# Patient Record
Sex: FEMALE | Race: BLACK OR AFRICAN AMERICAN | Employment: FULL TIME | URBAN - METROPOLITAN AREA
[De-identification: names, ages, dates, MRNs, and addresses within clinical notes are randomized per-mention and may not be internally consistent; named-entity substitution may affect disease eponyms.]

---

## 2017-02-15 ENCOUNTER — APPOINTMENT (OUTPATIENT)
Dept: GENERAL RADIOLOGY | Age: 47
End: 2017-02-15
Attending: EMERGENCY MEDICINE
Payer: COMMERCIAL

## 2017-02-15 ENCOUNTER — HOSPITAL ENCOUNTER (EMERGENCY)
Age: 47
Discharge: HOME | End: 2017-02-15
Attending: EMERGENCY MEDICINE
Payer: COMMERCIAL

## 2017-02-15 VITALS
TEMPERATURE: 98.2 F | RESPIRATION RATE: 16 BRPM | DIASTOLIC BLOOD PRESSURE: 72 MMHG | HEART RATE: 68 BPM | OXYGEN SATURATION: 99 % | SYSTOLIC BLOOD PRESSURE: 121 MMHG

## 2017-02-15 DIAGNOSIS — R07.89 ATYPICAL CHEST PAIN: Primary | ICD-10-CM

## 2017-02-15 LAB
ANION GAP BLD CALC-SCNC: 8 MMOL/L (ref 3–18)
BASOPHILS # BLD AUTO: 0 K/UL (ref 0–0.06)
BASOPHILS # BLD: 0 % (ref 0–2)
BUN SERPL-MCNC: 11 MG/DL (ref 7–18)
BUN/CREAT SERPL: 14 (ref 12–20)
CALCIUM SERPL-MCNC: 9 MG/DL (ref 8.5–10.1)
CHLORIDE SERPL-SCNC: 105 MMOL/L (ref 100–108)
CO2 SERPL-SCNC: 27 MMOL/L (ref 21–32)
CREAT SERPL-MCNC: 0.8 MG/DL (ref 0.6–1.3)
D DIMER PPP FEU-MCNC: 0.45 UG/ML(FEU)
DIFFERENTIAL METHOD BLD: ABNORMAL
EOSINOPHIL # BLD: 0.1 K/UL (ref 0–0.4)
EOSINOPHIL NFR BLD: 2 % (ref 0–5)
ERYTHROCYTE [DISTWIDTH] IN BLOOD BY AUTOMATED COUNT: 11.7 % (ref 11.6–14.5)
GLUCOSE SERPL-MCNC: 109 MG/DL (ref 74–99)
HCT VFR BLD AUTO: 34.2 % (ref 35–45)
HGB BLD-MCNC: 11.7 G/DL (ref 12–16)
LYMPHOCYTES # BLD AUTO: 45 % (ref 21–52)
LYMPHOCYTES # BLD: 2.6 K/UL (ref 0.9–3.6)
MCH RBC QN AUTO: 30.3 PG (ref 24–34)
MCHC RBC AUTO-ENTMCNC: 34.2 G/DL (ref 31–37)
MCV RBC AUTO: 88.6 FL (ref 74–97)
MONOCYTES # BLD: 0.4 K/UL (ref 0.05–1.2)
MONOCYTES NFR BLD AUTO: 6 % (ref 3–10)
NEUTS SEG # BLD: 2.7 K/UL (ref 1.8–8)
NEUTS SEG NFR BLD AUTO: 47 % (ref 40–73)
PLATELET # BLD AUTO: 219 K/UL (ref 135–420)
PMV BLD AUTO: 12.4 FL (ref 9.2–11.8)
POTASSIUM SERPL-SCNC: 3.7 MMOL/L (ref 3.5–5.5)
RBC # BLD AUTO: 3.86 M/UL (ref 4.2–5.3)
SODIUM SERPL-SCNC: 140 MMOL/L (ref 136–145)
WBC # BLD AUTO: 5.7 K/UL (ref 4.6–13.2)

## 2017-02-15 PROCEDURE — 85025 COMPLETE CBC W/AUTO DIFF WBC: CPT | Performed by: EMERGENCY MEDICINE

## 2017-02-15 PROCEDURE — 99283 EMERGENCY DEPT VISIT LOW MDM: CPT

## 2017-02-15 PROCEDURE — 80048 BASIC METABOLIC PNL TOTAL CA: CPT | Performed by: EMERGENCY MEDICINE

## 2017-02-15 PROCEDURE — 99284 EMERGENCY DEPT VISIT MOD MDM: CPT

## 2017-02-15 PROCEDURE — 93005 ELECTROCARDIOGRAM TRACING: CPT

## 2017-02-15 PROCEDURE — 71020 XR CHEST AP LAT: CPT

## 2017-02-15 PROCEDURE — 85379 FIBRIN DEGRADATION QUANT: CPT | Performed by: EMERGENCY MEDICINE

## 2017-02-15 PROCEDURE — 74011250637 HC RX REV CODE- 250/637: Performed by: EMERGENCY MEDICINE

## 2017-02-15 RX ORDER — LIDOCAINE HYDROCHLORIDE 20 MG/ML
15 SOLUTION OROPHARYNGEAL AS NEEDED
Status: DISCONTINUED | OUTPATIENT
Start: 2017-02-15 | End: 2017-02-16 | Stop reason: HOSPADM

## 2017-02-15 RX ADMIN — PHENOBARBITAL 30 ML: 16.2; .1037; .0065; .0194 ELIXIR ORAL at 19:52

## 2017-02-16 LAB
ATRIAL RATE: 75 BPM
CALCULATED P AXIS, ECG09: 56 DEGREES
CALCULATED R AXIS, ECG10: 70 DEGREES
CALCULATED T AXIS, ECG11: 48 DEGREES
DIAGNOSIS, 93000: NORMAL
P-R INTERVAL, ECG05: 148 MS
Q-T INTERVAL, ECG07: 388 MS
QRS DURATION, ECG06: 84 MS
QTC CALCULATION (BEZET), ECG08: 433 MS
VENTRICULAR RATE, ECG03: 75 BPM

## 2017-02-16 NOTE — DISCHARGE INSTRUCTIONS
Chest Pain: Care Instructions  Your Care Instructions  There are many things that can cause chest pain. Some are not serious and will get better on their own in a few days. But some kinds of chest pain need more testing and treatment. Your doctor may have recommended a follow-up visit in the next 8 to 12 hours. If you are not getting better, you may need more tests or treatment. Even though your doctor has released you, you still need to watch for any problems. The doctor carefully checked you, but sometimes problems can develop later. If you have new symptoms or if your symptoms do not get better, get medical care right away. If you have worse or different chest pain or pressure that lasts more than 5 minutes or you passed out (lost consciousness), call 911 or seek other emergency help right away. A medical visit is only one step in your treatment. Even if you feel better, you still need to do what your doctor recommends, such as going to all suggested follow-up appointments and taking medicines exactly as directed. This will help you recover and help prevent future problems. How can you care for yourself at home? · Rest until you feel better. · Take your medicine exactly as prescribed. Call your doctor if you think you are having a problem with your medicine. · Do not drive after taking a prescription pain medicine. When should you call for help? Call 911 if:  · You passed out (lost consciousness). · You have severe difficulty breathing. · You have symptoms of a heart attack. These may include:  ¨ Chest pain or pressure, or a strange feeling in your chest.  ¨ Sweating. ¨ Shortness of breath. ¨ Nausea or vomiting. ¨ Pain, pressure, or a strange feeling in your back, neck, jaw, or upper belly or in one or both shoulders or arms. ¨ Lightheadedness or sudden weakness. ¨ A fast or irregular heartbeat.   After you call 911, the  may tell you to chew 1 adult-strength or 2 to 4 low-dose aspirin. Wait for an ambulance. Do not try to drive yourself. Call your doctor today if:  · You have any trouble breathing. · Your chest pain gets worse. · You are dizzy or lightheaded, or you feel like you may faint. · You are not getting better as expected. · You are having new or different chest pain. Where can you learn more? Go to http://jose-shweta.info/. Enter A120 in the search box to learn more about \"Chest Pain: Care Instructions. \"  Current as of: May 27, 2016  Content Version: 11.1  © 2375-6059 [email or web address]. Care instructions adapted under license by [employer] (which disclaims liability or warranty for this information). If you have questions about a medical condition or this instruction, always ask your healthcare professional. [address] any warranty or liability for your use of this information. Musculoskeletal Chest Pain: Care Instructions  Your Care Instructions  Chest pain is not always a sign that something is wrong with your heart or that you have another serious problem. The doctor thinks your chest pain is caused by strained muscles or ligaments, inflamed chest cartilage, or another problem in your chest, rather than by your heart. You may need more tests to find the cause of your chest pain. Follow-up care is a key part of your treatment and safety. Be sure to make and go to all appointments, and call your doctor if you are having problems. Its also a good idea to know your test results and keep a list of the medicines you take. How can you care for yourself at home? · Take pain medicines exactly as directed. ¨ If the doctor gave you a prescription medicine for pain, take it as prescribed. ¨ If you are not taking a prescription pain medicine, ask your doctor if you can take an over-the-counter medicine. · Rest and protect the sore area.   · Stop, change, or take a break from any activity that may be causing your pain or soreness. · Put ice or a cold pack on the sore area for 10 to 20 minutes at a time. Try to do this every 1 to 2 hours for the next 3 days (when you are awake) or until the swelling goes down. Put a thin cloth between the ice and your skin. · After 2 or 3 days, apply a heating pad set on low or a warm cloth to the area that hurts. Some doctors suggest that you go back and forth between hot and cold. · Do not wrap or tape your ribs for support. This may cause you to take smaller breaths, which could increase your risk of lung problems. · Mentholated creams such as Bengay or Icy Hot may soothe sore muscles. Follow the instructions on the package. · Follow your doctor's instructions for exercising. · Gentle stretching and massage may help you get better faster. Stretch slowly to the point just before pain begins, and hold the stretch for at least 15 to 30 seconds. Do this 3 or 4 times a day. Stretch just after you have applied heat. · As your pain gets better, slowly return to your normal activities. Any increased pain may be a sign that you need to rest a while longer. When should you call for help? Call 911 anytime you think you may need emergency care. For example, call if:  · You have chest pain or pressure. This may occur with:  ¨ Sweating. ¨ Shortness of breath. ¨ Nausea or vomiting. ¨ Pain that spreads from the chest to the neck, jaw, or one or both shoulders or arms. ¨ Dizziness or lightheadedness. ¨ A fast or uneven pulse. After calling 911, chew 1 adult-strength aspirin. Wait for an ambulance. Do not try to drive yourself. · You have sudden chest pain and shortness of breath, or you cough up blood. Call your doctor now or seek immediate medical care if:  · You have any trouble breathing. · Your chest pain gets worse.   · Your chest pain occurs consistently with exercise and is relieved by rest.  Watch closely for changes in your health, and be sure to contact your doctor if:  · Your chest pain does not get better after 1 week. Where can you learn more? Go to http://jose-shweta.info/. Enter V293 in the search box to learn more about \"Musculoskeletal Chest Pain: Care Instructions. \"  Current as of: May 27, 2016  Content Version: 11.1  © 4671-2004 [employer]. Care instructions adapted under license by [employer] (which disclaims liability or warranty for this information). If you have questions about a medical condition or this instruction, always ask your healthcare professional. [address] any warranty or liability for your use of this information.

## 2017-02-16 NOTE — ED PROVIDER NOTES
HPI Comments: 7:11 PM Hunter Salter is a 40 y.o. female who presents to ED complaining of centralized CP with SOB onset at 4 PM today. Pt describes the CP as \"a heaviness and burning sensation. \" Pt states the pain started while at her daughter's doctors appointment. Pt admits she still has CP, but admits the SOB has subsided. She denies the CP traveling anywhere. No other concerns nor complaints at this time. PCP: PROVIDER UNKNOWN      The history is provided by the patient. History reviewed. No pertinent past medical history. History reviewed. No pertinent past surgical history. History reviewed. No pertinent family history. Social History     Social History    Marital status:      Spouse name: N/A    Number of children: N/A    Years of education: N/A     Occupational History    Not on file. Social History Main Topics    Smoking status: Never Smoker    Smokeless tobacco: Not on file    Alcohol use Not on file    Drug use: Not on file    Sexual activity: Not on file     Other Topics Concern    Not on file     Social History Narrative    No narrative on file         ALLERGIES: Review of patient's allergies indicates no known allergies. Review of Systems   Constitutional: Negative for chills, fatigue, fever and unexpected weight change. HENT: Negative for congestion and rhinorrhea. Respiratory: Positive for shortness of breath. Negative for chest tightness. Cardiovascular: Positive for chest pain (centralized). Negative for palpitations and leg swelling. Gastrointestinal: Negative for abdominal pain, nausea and vomiting. Genitourinary: Negative for dysuria. Musculoskeletal: Negative for back pain. Skin: Negative for rash. Neurological: Negative for dizziness and weakness. Psychiatric/Behavioral: The patient is not nervous/anxious. All other systems reviewed and are negative.       Vitals:    02/15/17 1735   BP: 128/81   Pulse: 72   Resp: 16 Temp: 98.2 °F (36.8 °C)   SpO2: 97%            Physical Exam   Constitutional: She is oriented to person, place, and time. She appears well-developed and well-nourished. No distress. HENT:   Head: Normocephalic and atraumatic. Right Ear: External ear normal.   Left Ear: External ear normal.   Nose: Nose normal.   Mouth/Throat: Oropharynx is clear and moist.   Eyes: Conjunctivae and EOM are normal. Pupils are equal, round, and reactive to light. No scleral icterus. Neck: Normal range of motion. Neck supple. No JVD present. No tracheal deviation present. No thyromegaly present. Cardiovascular: Normal rate, regular rhythm, normal heart sounds and intact distal pulses. Exam reveals no gallop and no friction rub. No murmur heard. Pulmonary/Chest: Effort normal and breath sounds normal. She exhibits no tenderness. Abdominal: Soft. Bowel sounds are normal. She exhibits no distension. There is no tenderness. There is no rebound and no guarding. Musculoskeletal: Normal range of motion. She exhibits no edema or tenderness. Lymphadenopathy:     She has no cervical adenopathy. Neurological: She is alert and oriented to person, place, and time. No cranial nerve deficit. Coordination normal.   No sensory loss, Gait normal, Motor 5/5   Skin: Skin is warm and dry. Psychiatric: She has a normal mood and affect. Her behavior is normal. Judgment and thought content normal.   Nursing note and vitals reviewed. MDM  Number of Diagnoses or Management Options  Atypical chest pain:   Diagnosis management comments: Pt feels better, results reviewed with her. She agrees with dispo and F/U plan.   Sylvia Gray MD  8:53 PM         Amount and/or Complexity of Data Reviewed  Clinical lab tests: ordered and reviewed  Tests in the radiology section of CPT®: ordered and reviewed      ED Course       Procedures  Vitals:  Patient Vitals for the past 12 hrs:   Temp Pulse Resp BP SpO2   02/15/17 1735 98.2 °F (36.8 °C) 72 16 128/81 97 %         Medications ordered:   Medications   lidocaine (XYLOCAINE) 2 % viscous solution 15 mL (not administered)   maalox/donnatal (GI COCKTAIL COMPOUND) oral liquid (30 mL Oral Given 2/15/17 1952)         Lab findings:  Recent Results (from the past 12 hour(s))   EKG, 12 LEAD, INITIAL    Collection Time: 02/15/17  5:17 PM   Result Value Ref Range    Ventricular Rate 75 BPM    Atrial Rate 75 BPM    P-R Interval 148 ms    QRS Duration 84 ms    Q-T Interval 388 ms    QTC Calculation (Bezet) 433 ms    Calculated P Axis 56 degrees    Calculated R Axis 70 degrees    Calculated T Axis 48 degrees    Diagnosis       Normal sinus rhythm  Normal ECG  No previous ECGs available     METABOLIC PANEL, BASIC    Collection Time: 02/15/17  5:27 PM   Result Value Ref Range    Sodium 140 136 - 145 mmol/L    Potassium 3.7 3.5 - 5.5 mmol/L    Chloride 105 100 - 108 mmol/L    CO2 27 21 - 32 mmol/L    Anion gap 8 3.0 - 18 mmol/L    Glucose 109 (H) 74 - 99 mg/dL    BUN 11 7.0 - 18 MG/DL    Creatinine 0.80 0.6 - 1.3 MG/DL    BUN/Creatinine ratio 14 12 - 20      GFR est AA >60 >60 ml/min/1.73m2    GFR est non-AA >60 >60 ml/min/1.73m2    Calcium 9.0 8.5 - 10.1 MG/DL   CBC WITH AUTOMATED DIFF    Collection Time: 02/15/17  5:27 PM   Result Value Ref Range    WBC 5.7 4.6 - 13.2 K/uL    RBC 3.86 (L) 4.20 - 5.30 M/uL    HGB 11.7 (L) 12.0 - 16.0 g/dL    HCT 34.2 (L) 35.0 - 45.0 %    MCV 88.6 74.0 - 97.0 FL    MCH 30.3 24.0 - 34.0 PG    MCHC 34.2 31.0 - 37.0 g/dL    RDW 11.7 11.6 - 14.5 %    PLATELET 100 560 - 645 K/uL    MPV 12.4 (H) 9.2 - 11.8 FL    NEUTROPHILS 47 40 - 73 %    LYMPHOCYTES 45 21 - 52 %    MONOCYTES 6 3 - 10 %    EOSINOPHILS 2 0 - 5 %    BASOPHILS 0 0 - 2 %    ABS. NEUTROPHILS 2.7 1.8 - 8.0 K/UL    ABS. LYMPHOCYTES 2.6 0.9 - 3.6 K/UL    ABS. MONOCYTES 0.4 0.05 - 1.2 K/UL    ABS. EOSINOPHILS 0.1 0.0 - 0.4 K/UL    ABS.  BASOPHILS 0.0 0.0 - 0.06 K/UL    DF AUTOMATED     D DIMER    Collection Time: 02/15/17  5:27 PM Result Value Ref Range    D DIMER 0.45 <0.46 ug/ml(FEU)     EKG  NSR at a rate of 75 BPM. No acute changes. Read by Dr. Bishop Speak 7:20 PM      X-Ray, CT or other radiology findings or impressions:  XR Chest   No active cardiopulmonary disease  Read by radiology     Progress notes, Consult notes or additional Procedure notes:   8:49 PM  Reassessed pt. Patient is feeling better. Reviewed all results with pt and pt agrees with plan for discharge and appropriate  Follow up. All questions answered at this time. Pt voices understanding. Disposition:  Diagnosis: No diagnosis found. Disposition: discharged     Follow-up Information     Follow up With Details Comments [address] [place] [address] Schedule an appointment as soon as possible for a visit in 1 week If symptoms worsen return to the ER [address]  [address] [address]  [address]  [phone]           Patient's Medications    No medications on file     [address] for and in the presence of Yarelis Burton MD (02/15/17/ 7:11 PM)    Physician Attestation  I personally performed the services described in this documentation, reviewed, and edited the documentation which was dictated to the scribe in my presence, and it accurately records my own words and actions.      Yarelis Burton MD (02/15/17/ 7:11 PM)    Signed by: Sarah Yanez, 02/15/17, 7:11 PM\

## 2019-12-05 ENCOUNTER — HOSPITAL ENCOUNTER (OUTPATIENT)
Dept: MAMMOGRAPHY | Age: 49
Discharge: HOME | End: 2019-12-05
Attending: FAMILY MEDICINE
Payer: COMMERCIAL

## 2019-12-05 DIAGNOSIS — Z12.31 VISIT FOR SCREENING MAMMOGRAM: ICD-10-CM

## 2019-12-05 PROCEDURE — 77063 BREAST TOMOSYNTHESIS BI: CPT

## 2020-11-20 ENCOUNTER — TRANSCRIBE ORDER (OUTPATIENT)
Dept: SCHEDULING | Age: 50
End: 2020-11-20

## 2020-11-20 DIAGNOSIS — Z12.31 SCREENING MAMMOGRAM FOR HIGH-RISK PATIENT: Primary | ICD-10-CM

## 2021-02-05 ENCOUNTER — HOSPITAL ENCOUNTER (OUTPATIENT)
Dept: MAMMOGRAPHY | Age: 51
Discharge: HOME | End: 2021-02-05
Attending: FAMILY MEDICINE
Payer: COMMERCIAL

## 2021-02-05 DIAGNOSIS — Z12.31 SCREENING MAMMOGRAM FOR HIGH-RISK PATIENT: ICD-10-CM

## 2021-02-05 PROCEDURE — 77063 BREAST TOMOSYNTHESIS BI: CPT

## 2021-12-31 ENCOUNTER — TRANSCRIBE ORDER (OUTPATIENT)
Dept: SCHEDULING | Age: 51
End: 2021-12-31

## 2021-12-31 DIAGNOSIS — Z12.31 VISIT FOR SCREENING MAMMOGRAM: Primary | ICD-10-CM

## 2022-02-10 ENCOUNTER — HOSPITAL ENCOUNTER (OUTPATIENT)
Dept: MAMMOGRAPHY | Age: 52
Discharge: HOME | End: 2022-02-10
Attending: FAMILY MEDICINE
Payer: COMMERCIAL

## 2022-02-10 DIAGNOSIS — Z12.31 VISIT FOR SCREENING MAMMOGRAM: ICD-10-CM

## 2022-02-10 PROCEDURE — 77067 SCR MAMMO BI INCL CAD: CPT

## 2022-02-10 PROCEDURE — 77063 BREAST TOMOSYNTHESIS BI: CPT

## 2023-01-20 ENCOUNTER — TRANSCRIBE ORDER (OUTPATIENT)
Dept: SCHEDULING | Age: 53
End: 2023-01-20

## 2023-01-20 DIAGNOSIS — Z12.31 ENCOUNTER FOR SCREENING MAMMOGRAM FOR BREAST CANCER: Primary | ICD-10-CM

## 2023-01-28 ENCOUNTER — TRANSCRIBE ORDERS (OUTPATIENT)
Facility: HOSPITAL | Age: 53
End: 2023-01-28

## 2023-01-28 DIAGNOSIS — Z12.31 VISIT FOR SCREENING MAMMOGRAM: Primary | ICD-10-CM

## 2023-02-01 DIAGNOSIS — Z12.31 ENCOUNTER FOR SCREENING MAMMOGRAM FOR BREAST CANCER: Primary | ICD-10-CM

## 2023-02-05 DIAGNOSIS — Z12.31 ENCOUNTER FOR SCREENING MAMMOGRAM FOR BREAST CANCER: Primary | ICD-10-CM

## 2023-02-14 ENCOUNTER — HOSPITAL ENCOUNTER (OUTPATIENT)
Facility: HOSPITAL | Age: 53
Discharge: HOME | End: 2023-02-17
Payer: COMMERCIAL

## 2023-02-14 DIAGNOSIS — Z12.31 VISIT FOR SCREENING MAMMOGRAM: ICD-10-CM

## 2023-02-14 PROCEDURE — 77063 BREAST TOMOSYNTHESIS BI: CPT

## 2024-02-16 ENCOUNTER — HOSPITAL ENCOUNTER (OUTPATIENT)
Facility: HOSPITAL | Age: 54
Setting detail: RECURRING SERIES
Discharge: HOME | End: 2024-02-19
Payer: COMMERCIAL

## 2024-02-16 PROCEDURE — 97535 SELF CARE MNGMENT TRAINING: CPT

## 2024-02-16 PROCEDURE — 97161 PT EVAL LOW COMPLEX 20 MIN: CPT

## 2024-02-16 PROCEDURE — 97110 THERAPEUTIC EXERCISES: CPT

## 2024-02-16 NOTE — PROGRESS NOTES
[facility]  [address] [facility] [address] [place] [address] Ph:[phone] Fx: [phone]    PLAN OF CARE/ Statement of Necessity for Physical Therapy Services           Patient name: Jenny Aparicio Start of Care: 2024   Referral source: Zuleyma Link MD : 1970    Medical Diagnosis: Back pain [M54.9]  Leg pain [M79.606]       Onset Date:    Treatment Diagnosis: M54.59  OTHER LOWER BACK PAIN                                     Prior Hospitalization: see medical history Provider#: [id number]   Medications: Verified on Patient Summary List     Comorbidities:  Per pt no medical history   Prior Level of Function: functionally independent, no AD.     The Plan of Care and following information is based on the information from the initial evaluation.    Assessment / key information:   The patient is a 53-year-old female referred to therapy with lumbar radiculopathy pain. The patient reported a current pain level of 0/10 which gets worsen to 10/10 with lying on side and prolong sitting . The patient reports sudden onset of low back pain while lying a year ago and has intermittent radiating pain in B LE, right more than left. Pt reports pain eases with exercises and walking. Pts recent x-ray showed bone spurs. Pt denies tingling and numbness this date and described pain as sharp and shooting in nature. During the objective assessment, the patient demonstrated increased pain during repeated flexion. The patient pointed pain in right lower back (QL area) describing sharp in nature. Additionally, the patient showed positive tenderness, stiffness, and weakness in both lower extremities, resulting in functional limitations. Furthermore, pt showed +slump test on right possible disc involvement. Skilled therapy is recommended to address the above deficits and help the patient return to her prior level of function.     Evaluation Complexity:  History:  LOW 
flu)  [] Yes [x] No Numbness/tingling in buttock/genitalia region    Past History/Treatments:     Diagnostic Tests: [] Lab work [x] X-rays    [] CT [] MRI     [] Other:  Results: per pts bone spurs.       OBJECTIVE  Posture:  Lateral Shift:  [] R    [] L     [] +  [] -  Kyphosis:  [] Increased [] Decreased   []  WNL  Lordosis:   [] Increased [x] Decreased   [] WNL  Pelvic symmetry:  [x] WNL    [] Other:    Gait:  [x] Normal     [] Abnormal:    Active Movements: [] N/A   [] Too acute   [] Other:  ROM AROM in cm % PROM Comments:pain, area   Forward flexion 40-60 12     Extension 20-30      SB right 20-30 49  Pain increased   SB left 20-30 49     Rotation right 5-10      Rotation left 5-10        Repeated Movements   Effects on present pain: produces (OH), abolishes (A), increases (incr), decreases (decr), centralizes (C), peripheral (PH), no effect (NE)   Pre-Test Sx Flexion Repeated Flexion Extension Repeated Extension Repeated SBL Repeated SBR Repeated   Rotation Right Repeated   Rotation left   Sitting   incr  decr Incr NE NE Incr   Standing            Lying      N/A N/A     Comments: Patient pointed pain on right lower back.     Dural Mobility:  SLR Sitting:  [x] R    [x] L    [] +    [x] -            Supine:  [] R    [] L    [] +    [] -    Slump Test:  [x] R    [] L    [x] +    [] -    Prone Knee Bend: [] R    [] L    [] +    [] -     Palpation  [] Min  [x] Mod  [] Severe    Location: Tenderness of right QL and lumbar paraspinals.  [] Min  [] Mod  [] Severe    Location:  [] Min  [] Mod  [] Severe    Location:    Strength   L(0-5) R (0-5) N/T   Hip Flexion  4+ 4+ []   Hip Extension 4+ 4 []   Hip Abduction 4+ 4 []   Hip Adduction 4+ 4+ []   Knee Extension   []   Knee Flexion    []   Ankle Dorsiflexion   []   Ankle Plantar flexion   []   Ankle Inversion   []   Ankle Eversion   []   Great Toe Extension   []   Other   []             Pain Level (0-10 scale) post treatment: 4/10    ASSESSMENT/Changes in Function:

## 2024-02-23 ENCOUNTER — HOSPITAL ENCOUNTER (OUTPATIENT)
Facility: HOSPITAL | Age: 54
Setting detail: RECURRING SERIES
Discharge: HOME | End: 2024-02-26
Payer: COMMERCIAL

## 2024-02-23 PROCEDURE — 97112 NEUROMUSCULAR REEDUCATION: CPT

## 2024-02-23 PROCEDURE — 97110 THERAPEUTIC EXERCISES: CPT

## 2024-02-23 NOTE — PROGRESS NOTES
PHYSICAL / OCCUPATIONAL THERAPY - DAILY TREATMENT NOTE     Patient Name: Jenny Aparicio    Date: 2024    : 1970  Insurance: Payor: [employer] / Plan: [facility] / Product Type: *No Product type* /      Patient  verified Yes     Visit #   Current / Total 2 24   Time   In / Out 10:32 11:10   Pain   In / Out 0 0   Subjective Functional Status/Changes: Pt reports feeling good this morning   Changes to:  Allergies, Med Hx, Sx Hx?   no       TREATMENT AREA =  Back pain [M54.9]    OBJECTIVE      Therapeutic Procedures:  Tx Min Billable or 1:1 Min (if diff from Tx Min) Procedure, Rationale, Specifics   28  11999 Therapeutic Exercise (timed):  increase ROM, strength, coordination, balance, and proprioception to improve patient's ability to progress to PLOF and address remaining functional goals. (see flow sheet as applicable)    Details if applicable:       10  12089 Neuromuscular Re-Education (timed):  improve balance, coordination, kinesthetic sense, posture, core stability and proprioception to improve patient's ability to develop conscious control of individual muscles and awareness of position of extremities in order to progress to PLOF and address remaining functional goals. (see flow sheet as applicable)    Details if applicable:     38  [facility] Totals Reminder: bill using total billable min of TIMED therapeutic procedures (example: do not include dry needle or estim unattended, both untimed codes, in totals to left)  8-22 min = 1 unit; 23-37 min = 2 units; 38-52 min = 3 units; 53-67 min = 4 units; 68-82 min = 5 units   Total Total     TOTAL TREATMENT TIME:        38     [x]  Patient Education billed concurrently with other procedures   [x] Review HEP    [] Progressed/Changed HEP, detail:    [] Other detail:       Objective Information/Functional Measures/Assessment    First f/u after Eval. Demo's good tolerance to therex, vc's for technique and posture as needed. Pt reports min central LBP

## 2024-02-26 ENCOUNTER — HOSPITAL ENCOUNTER (OUTPATIENT)
Facility: HOSPITAL | Age: 54
Setting detail: RECURRING SERIES
Discharge: HOME | End: 2024-02-29
Payer: COMMERCIAL

## 2024-02-26 PROCEDURE — 97110 THERAPEUTIC EXERCISES: CPT

## 2024-02-26 PROCEDURE — 97112 NEUROMUSCULAR REEDUCATION: CPT

## 2024-02-26 NOTE — PROGRESS NOTES
PHYSICAL / OCCUPATIONAL THERAPY - DAILY TREATMENT NOTE     Patient Name: Jenny Aparicio    Date: 2024    : 1970  Insurance: Payor: [employer] / Plan: [facility] [facility] / Product Type: *No Product type* /      Patient  verified Yes     Visit #   Current / Total 3 24   Time   In / Out 353 4:38   Pain   In / Out 0 0   Subjective Functional Status/Changes: Pt reports no pain upon arrival.    Changes to:  Allergies, Med Hx, Sx Hx?   no       TREATMENT AREA =  Back pain [M54.9]    OBJECTIVE    Therapeutic Procedures:  Tx Min Billable or 1:1 Min (if diff from Tx Min) Procedure, Rationale, Specifics   35  22816 Therapeutic Exercise (timed):  increase ROM, strength, coordination, balance, and proprioception to improve patient's ability to progress to PLOF and address remaining functional goals. (see flow sheet as applicable)    Details if applicable:       10  13746 Neuromuscular Re-Education (timed):  improve balance, coordination, kinesthetic sense, posture, core stability and proprioception to improve patient's ability to develop conscious control of individual muscles and awareness of position of extremities in order to progress to PLOF and address remaining functional goals. (see flow sheet as applicable)    Details if applicable:            Details if applicable:           Details if applicable:            Details if applicable:     45  [facility] Totals Reminder: bill using total billable min of TIMED therapeutic procedures (example: do not include dry needle or estim unattended, both untimed codes, in totals to left)  8-22 min = 1 unit; 23-37 min = 2 units; 38-52 min = 3 units; 53-67 min = 4 units; 68-82 min = 5 units   Total Total     TOTAL TREATMENT TIME:        45     [x]  Patient Education billed concurrently with other procedures   [x] Review HEP    [] Progressed/Changed HEP, detail:    [] Other detail:       Objective Information/Functional Measures/Assessment  Mobility limitations more on the

## 2024-02-29 ENCOUNTER — HOSPITAL ENCOUNTER (OUTPATIENT)
Facility: HOSPITAL | Age: 54
Setting detail: RECURRING SERIES
End: 2024-02-29
Payer: COMMERCIAL

## 2024-02-29 PROCEDURE — 97110 THERAPEUTIC EXERCISES: CPT

## 2024-02-29 PROCEDURE — 97112 NEUROMUSCULAR REEDUCATION: CPT

## 2024-02-29 NOTE — PROGRESS NOTES
PHYSICAL / OCCUPATIONAL THERAPY - DAILY TREATMENT NOTE     Patient Name: Jenny Aparicio    Date: 2024    : 1970  Insurance: Payor: [employer] / Plan: [facility] [facility] / Product Type: *No Product type* /      Patient  verified Yes     Visit #   Current / Total 4 24   Time   In / Out 4:30 5:08   Pain   In / Out 0 0   Subjective Functional Status/Changes: Pt reports, she has radha doing exercises at home and has no pain.   Changes to:  Allergies, Med Hx, Sx Hx?   no       TREATMENT AREA =  Back pain [M54.9]    OBJECTIVE      Therapeutic Procedures:  Tx Min Billable or 1:1 Min (if diff from Tx Min) Procedure, Rationale, Specifics   24  58872 Neuromuscular Re-Education (timed):  improve balance, coordination, kinesthetic sense, posture, core stability and proprioception to improve patient's ability to develop conscious control of individual muscles and awareness of position of extremities in order to progress to PLOF and address remaining functional goals. (see flow sheet as applicable)    Details if applicable:       14  38736 Therapeutic Exercise (timed):  increase ROM, strength, coordination, balance, and proprioception to improve patient's ability to progress to PLOF and address remaining functional goals. (see flow sheet as applicable)    Details if applicable:            Details if applicable:           Details if applicable:            Details if applicable:     38  [facility] Totals Reminder: bill using total billable min of TIMED therapeutic procedures (example: do not include dry needle or estim unattended, both untimed codes, in totals to left)  8-22 min = 1 unit; 23-37 min = 2 units; 38-52 min = 3 units; 53-67 min = 4 units; 68-82 min = 5 units   Total Total     TOTAL TREATMENT TIME:        38     [x]  Patient Education billed concurrently with other procedures   [x] Review HEP    [] Progressed/Changed HEP, detail:    [] Other detail:       Objective Information/Functional

## 2024-03-04 ENCOUNTER — HOSPITAL ENCOUNTER (OUTPATIENT)
Facility: HOSPITAL | Age: 54
Setting detail: RECURRING SERIES
Discharge: HOME | End: 2024-03-07
Payer: COMMERCIAL

## 2024-03-04 PROCEDURE — 97112 NEUROMUSCULAR REEDUCATION: CPT

## 2024-03-04 PROCEDURE — 97110 THERAPEUTIC EXERCISES: CPT

## 2024-03-04 NOTE — PROGRESS NOTES
Measures/Assessment  Several exercises on TC due to pt late arrival. 1.5# ankle weight added to exercises at the barrel to continue to strengthen the glutes and B hips and aid with increased L/S stability. Continued pain with prolonged sitting and with repeated flexion through the L/S. The pt notes no pain post treatment.       Patient will continue to benefit from skilled PT / OT services to modify and progress therapeutic interventions, analyze and address functional mobility deficits, analyze and address ROM deficits, analyze and address strength deficits, analyze and address soft tissue restrictions, and analyze and cue for proper movement patterns to address functional deficits and attain remaining goals.    Progress toward goals / Updated goals:  [x]  See Progress Note/Recertification  Short Term Goals: To be accomplished in 4 weeks:  Patient will be independent and compliant with HEP to progress toward goals and restore functional mobility.   Eval Status: issued at eval  2/23/2024: Pt reports compliance, MET     Patient will improve pain in low back to 6/10 at worst  to improve household chore tolerance.  Eval Status: 10/10 at worst  2/29/24: 7/10 at worst. Progressing.      Long Term Goals: To be accomplished in 12 weeks:  Patient will improve FOTO score by 68 points to improve functional tolerance for household tasks.  Eval Status: FOTO 56       2.   Pt will report minimal to no pain during repeated bending to aid in functional mechanics for ADLs.  Eval Status: increased pain with repeated flexion  Current: no change 3/4/24 increased pain with repeated flexion     3.   Pt will have 5/5 B LE strength to return to goals of increased postural stability and mechanics during bending and lifting activities.  Eval Status: 4/5 to 4+/5     4.   Patient will improve pain in low back to 4/10 at worst to improve sitting and lying on side tolerance and restore prior level of function.  Eval Status: 10/10 at

## 2024-03-06 ENCOUNTER — HOSPITAL ENCOUNTER (OUTPATIENT)
Facility: HOSPITAL | Age: 54
Setting detail: RECURRING SERIES
Discharge: HOME | End: 2024-03-09
Payer: COMMERCIAL

## 2024-03-06 PROCEDURE — 97112 NEUROMUSCULAR REEDUCATION: CPT

## 2024-03-06 PROCEDURE — 97110 THERAPEUTIC EXERCISES: CPT

## 2024-03-06 NOTE — PROGRESS NOTES
PHYSICAL / OCCUPATIONAL THERAPY - DAILY TREATMENT NOTE     Patient Name: Jenny Aparicio    Date: 3/6/2024    : 1970  Insurance: Payor: GIORGI / Plan: [facility] [employer] / Product Type: *No Product type* /      Patient  verified Yes     Visit #   Current / Total 6 24   Time   In / Out 358 446   Pain   In / Out 0 0   Subjective Functional Status/Changes: Pt reports no new changes.   Changes to:  Allergies, Med Hx, Sx Hx?   no       TREATMENT AREA =  Back pain [M54.9]    OBJECTIVE    Therapeutic Procedures:  Tx Min Billable or 1:1 Min (if diff from Tx Min) Procedure, Rationale, Specifics   33  80953 Therapeutic Exercise (timed):  increase ROM, strength, coordination, balance, and proprioception to improve patient's ability to progress to PLOF and address remaining functional goals. (see flow sheet as applicable)    Details if applicable:       15  44279 Neuromuscular Re-Education (timed):  improve balance, coordination, kinesthetic sense, posture, core stability and proprioception to improve patient's ability to develop conscious control of individual muscles and awareness of position of extremities in order to progress to PLOF and address remaining functional goals. (see flow sheet as applicable)    Details if applicable:  prone series, DD: marching, UE/LE          Details if applicable:           Details if applicable:            Details if applicable:     48  [facility] Totals Reminder: bill using total billable min of TIMED therapeutic procedures (example: do not include dry needle or estim unattended, both untimed codes, in totals to left)  8-22 min = 1 unit; 23-37 min = 2 units; 38-52 min = 3 units; 53-67 min = 4 units; 68-82 min = 5 units   Total Total     TOTAL TREATMENT TIME:        48     [x]  Patient Education billed concurrently with other procedures   [x] Review HEP    [] Progressed/Changed HEP, detail:    [] Other detail:       Objective Information/Functional Measures/Assessment  [place]

## 2024-03-11 ENCOUNTER — HOSPITAL ENCOUNTER (OUTPATIENT)
Facility: HOSPITAL | Age: 54
Setting detail: RECURRING SERIES
Discharge: HOME | End: 2024-03-14
Payer: COMMERCIAL

## 2024-03-11 PROCEDURE — 97110 THERAPEUTIC EXERCISES: CPT

## 2024-03-11 PROCEDURE — 97112 NEUROMUSCULAR REEDUCATION: CPT

## 2024-03-11 NOTE — PROGRESS NOTES
back to 4/10 at worst to improve sitting and lying on side tolerance and restore prior level of function.  Eval Status: 10/10 at worst  2/29/24: 7/10 at worst. Progressing.       PLAN  Yes  Continue plan of care  [x]  Upgrade activities as tolerated  []  Discharge due to :  []  Other:    Jing Kaur PTA    3/11/2024    3:56 PM    Future Appointments   Date Time Provider Department Center   3/13/2024  3:50 PM Jing Kaur PTA [facility]   3/15/2024  3:15 PM HBV [facility] RM 3 3D [facility]

## 2024-03-13 ENCOUNTER — HOSPITAL ENCOUNTER (OUTPATIENT)
Facility: HOSPITAL | Age: 54
Setting detail: RECURRING SERIES
Discharge: HOME | End: 2024-03-16
Payer: COMMERCIAL

## 2024-03-13 PROCEDURE — 97110 THERAPEUTIC EXERCISES: CPT

## 2024-03-13 PROCEDURE — 97530 THERAPEUTIC ACTIVITIES: CPT

## 2024-03-13 NOTE — PROGRESS NOTES
PHYSICAL / OCCUPATIONAL THERAPY - DAILY TREATMENT NOTE     Patient Name: Jenny Aparicio    Date: 3/13/2024    : 1970  Insurance: Payor: GOIRGI / Plan: [facility] / Product Type: *No Product type* /      Patient  verified Yes     Visit #   Current / Total 8 24   Time   In / Out 355 430   Pain   In / Out 0 (low back), 5/10 (upper back) 0 (Low back)  5 (upper back)   Subjective Functional Status/Changes: Pt reports she is still having a lot of pain in the left side of her upper back, \"kind of in the back of my shoulder\" that has increased to a 5/10.    Changes to:  Allergies, Med Hx, Sx Hx?   no       TREATMENT AREA =  Back pain [M54.9]    OBJECTIVE    Therapeutic Procedures:  Tx Min Billable or 1:1 Min (if diff from Tx Min) Procedure, Rationale, Specifics   27  50820 Therapeutic Exercise (timed):  increase ROM, strength, coordination, balance, and proprioception to improve patient's ability to progress to PLOF and address remaining functional goals. (see flow sheet as applicable)    Details if applicable:       8  72314 Therapeutic Activity (timed):  use of dynamic activities replicating functional movements to increase ROM, strength, coordination, balance, and proprioception in order to improve patient's ability to progress to PLOF and address remaining functional goals.  (see flow sheet as applicable)    Details if applicable:  Goal check, FOTO           Details if applicable:           Details if applicable:            Details if applicable:     35  [facility] Totals Reminder: bill using total billable min of TIMED therapeutic procedures (example: do not include dry needle or estim unattended, both untimed codes, in totals to left)  8-22 min = 1 unit; 23-37 min = 2 units; 38-52 min = 3 units; 53-67 min = 4 units; 68-82 min = 5 units   Total Total     TOTAL TREATMENT TIME:        35     [x]  Patient Education billed concurrently with other procedures   [x] Review HEP    [] Progressed/Changed 
to improve sitting and lying on side tolerance and restore prior level of function.  Eval Status: 10/10 at worst  PN: 7/10 at worst. Progressing.       Summary of Care/ Key Functional Changes:   The pt is progressing well with treatment to her low back displaying gradual but increased B hip strength and noting decreasing pain. The pt was able to meet her FOTO goal indicating improved quality of life and notes minor increases in prolonged sitting tolerance. The pt does continue to report increased pain with repetitive ext of the L/S and still continues to have some increased pain with prolonged sitting, although slowly improving. Pt is to benefit from continued treatment to improve Core strength, sitting tolerance and over all functional mobility to aid with ease of ADL's.     ASSESSMENT/RECOMMENDATIONS:    Continue per plan of care.     Thank you for this referral.   Jing Kaur, PTA 3/13/2024 5:29 PM  Madhu Song, PT, CMTPT/DN

## 2024-03-15 ENCOUNTER — HOSPITAL ENCOUNTER (OUTPATIENT)
Facility: HOSPITAL | Age: 54
Discharge: HOME | End: 2024-03-15
Payer: COMMERCIAL

## 2024-03-15 DIAGNOSIS — Z12.31 VISIT FOR SCREENING MAMMOGRAM: ICD-10-CM

## 2024-03-15 PROCEDURE — 77063 BREAST TOMOSYNTHESIS BI: CPT

## 2024-03-19 ENCOUNTER — APPOINTMENT (OUTPATIENT)
Facility: HOSPITAL | Age: 54
End: 2024-03-19
Payer: COMMERCIAL

## 2024-03-22 ENCOUNTER — HOSPITAL ENCOUNTER (OUTPATIENT)
Facility: HOSPITAL | Age: 54
Setting detail: RECURRING SERIES
Discharge: HOME | End: 2024-03-25
Payer: COMMERCIAL

## 2024-03-22 PROCEDURE — 97112 NEUROMUSCULAR REEDUCATION: CPT

## 2024-03-22 PROCEDURE — 97110 THERAPEUTIC EXERCISES: CPT

## 2024-03-22 NOTE — PROGRESS NOTES
PHYSICAL / OCCUPATIONAL THERAPY - DAILY TREATMENT NOTE     Patient Name: Jenny Aparicio    Date: 3/22/2024    : 1970  Insurance: Payor: GIORGI / Plan: [facility] / Product Type: *No Product type* /      Patient  verified Yes     Visit #   Current / Total 9 24   Time   In / Out 1234 100   Pain   In / Out 0 0   Subjective Functional Status/Changes: Pt reports she has to leave a little early to take her daughter to an appointment.    Changes to:  Allergies, Med Hx, Sx Hx?   no       TREATMENT AREA =  Back pain [M54.9]    OBJECTIVE    Therapeutic Procedures:  Tx Min Billable or 1:1 Min (if diff from Tx Min) Procedure, Rationale, Specifics   8  16635 Therapeutic Exercise (timed):  increase ROM, strength, coordination, balance, and proprioception to improve patient's ability to progress to PLOF and address remaining functional goals. (see flow sheet as applicable)    Details if applicable:       18  18901 Neuromuscular Re-Education (timed):  improve balance, coordination, kinesthetic sense, posture, core stability and proprioception to improve patient's ability to develop conscious control of individual muscles and awareness of position of extremities in order to progress to PLOF and address remaining functional goals. (see flow sheet as applicable)    Details if applicable:            Details if applicable:           Details if applicable:            Details if applicable:     26  [facility] Totals Reminder: bill using total billable min of TIMED therapeutic procedures (example: do not include dry needle or estim unattended, both untimed codes, in totals to left)  8-22 min = 1 unit; 23-37 min = 2 units; 38-52 min = 3 units; 53-67 min = 4 units; 68-82 min = 5 units   Total Total     TOTAL TREATMENT TIME:        26     [x]  Patient Education billed concurrently with other procedures   [x] Review HEP    [] Progressed/Changed HEP, detail:    [] Other detail:       Objective Information/Functional

## 2024-04-08 ENCOUNTER — HOSPITAL ENCOUNTER (OUTPATIENT)
Facility: HOSPITAL | Age: 54
Setting detail: RECURRING SERIES
Discharge: HOME | End: 2024-04-11
Payer: COMMERCIAL

## 2024-04-08 PROCEDURE — 97110 THERAPEUTIC EXERCISES: CPT

## 2024-04-08 PROCEDURE — 97112 NEUROMUSCULAR REEDUCATION: CPT

## 2024-04-08 NOTE — PROGRESS NOTES
[facility]  [address] [place] [address] - Ph: [phone]   Fx: [phone]    PHYSICAL THERAPY PROGRESS NOTE      Patient name: Jenny Aparicio Start of Care: 2024    Referral source: Zuleyma Link MD : 1970    Medical Diagnosis: Back pain [M54.9]  Payor: GIORGI / Plan: [facility] [facility] [employer] / Product Type: *No Product type* /  Onset Date:     Treatment Diagnosis: M54.59 OTHER LOWER BACK PAIN    Prior Hospitalization: see medical history Provider#: [id number]   Medications: Verified on Patient summary List   Comorbidities:  Per pt no medical history   Prior Level of Function: functionally independent, no AD    Visits from Start of Care: 10    Missed Visits: 1    Goals/Measure of Progress: To be achieved in 12 weeks:  Short Term Goals: To be accomplished in 4 weeks:  Patient will be independent and compliant with HEP to progress toward goals and restore functional mobility.   Eval Status: issued at eval  PN: Met Pt reports compliance     Patient will improve pain in low back to 6/10 at worst  to improve household chore tolerance.  Eval Status: 10/10 at worst  PN: regressed  pain at worst 10/10 after sitting for 2 hours during a drive out of town.     Long Term Goals: To be accomplished in 12 weeks:  Patient will improve FOTO score by 68 points to improve functional tolerance for household tasks.  Eval Status: FOTO 56  PN: Met FOTO score 75        2.   Pt will report minimal to no pain during repeated bending to aid in functional mechanics for ADLs.  Eval Status: increased pain with repeated flexion  PN: slowly progressing continued pain but improving in tolerance and intensity.     3.   Pt will have 5/5 B LE strength to return to goals of increased postural stability and mechanics during bending and lifting activities.  Eval Status: 4/5 to 4+/5  PN: no change B hip flex: 5/5, B hip ABD : 4+/5, Left Hip Ext 4/5,

## 2024-04-08 NOTE — PROGRESS NOTES
PHYSICAL / OCCUPATIONAL THERAPY - DAILY TREATMENT NOTE     Patient Name: Jenny Aparicio    Date: 2024    : 1970  Insurance: Payor: GIORGI / Plan: [facility] [facility] [employer] / Product Type: *No Product type* /      Patient  verified Yes     Visit #   Current / Total 10 24   Time   In / Out 404 444   Pain   In / Out 0 0   Subjective Functional Status/Changes: Pt reports she is just getting back from being out of town and the drive got a little rough but she made it through it.   Changes to:  Allergies, Med Hx, Sx Hx?   no       TREATMENT AREA =  Back pain [M54.9]    OBJECTIVE    Therapeutic Procedures:  Tx Min Billable or 1:1 Min (if diff from Tx Min) Procedure, Rationale, Specifics   10  55103 Therapeutic Exercise (timed):  increase ROM, strength, coordination, balance, and proprioception to improve patient's ability to progress to PLOF and address remaining functional goals. (see flow sheet as applicable)    Details if applicable:       30  41366 Neuromuscular Re-Education (timed):  improve balance, coordination, kinesthetic sense, posture, core stability and proprioception to improve patient's ability to develop conscious control of individual muscles and awareness of position of extremities in order to progress to PLOF and address remaining functional goals. (see flow sheet as applicable)    Details if applicable:            Details if applicable:           Details if applicable:            Details if applicable:     40  [facility] Totals Reminder: bill using total billable min of TIMED therapeutic procedures (example: do not include dry needle or estim unattended, both untimed codes, in totals to left)  8-22 min = 1 unit; 23-37 min = 2 units; 38-52 min = 3 units; 53-67 min = 4 units; 68-82 min = 5 units   Total Total     TOTAL TREATMENT TIME:        40     [x]  Patient Education billed concurrently with other procedures   [x] Review HEP    [] Progressed/Changed HEP, detail:    [] Other detail:

## 2024-04-17 ENCOUNTER — APPOINTMENT (OUTPATIENT)
Facility: HOSPITAL | Age: 54
End: 2024-04-17
Payer: COMMERCIAL

## 2024-04-19 ENCOUNTER — TELEPHONE (OUTPATIENT)
Facility: HOSPITAL | Age: 54
End: 2024-04-19

## 2024-04-23 ENCOUNTER — HOSPITAL ENCOUNTER (OUTPATIENT)
Facility: HOSPITAL | Age: 54
Setting detail: RECURRING SERIES
Discharge: HOME | End: 2024-04-26
Payer: COMMERCIAL

## 2024-04-23 PROCEDURE — 97110 THERAPEUTIC EXERCISES: CPT

## 2024-04-23 PROCEDURE — 97112 NEUROMUSCULAR REEDUCATION: CPT

## 2024-04-23 PROCEDURE — 97530 THERAPEUTIC ACTIVITIES: CPT

## 2024-04-23 NOTE — PROGRESS NOTES
[facility]  [address]  [place] [address]  [phone] fax    Physical Therapy Discharge Summary  Patient name: Jenny Aparicio Start of Care: 24   Referral source: Zuleyma Link MD : 1970   Medical/Treatment Diagnosis: Back pain [M54.9]  Payor: GIORGI / Plan: [employer] [facility] [employer] / Product Type: *No Product type* /  Onset Date:23     Prior Hospitalization: see medical history Provider#: [id number]   Medications: Verified on Patient Summary List    Comorbidities: Per pt no medical history    Prior Level of Function:functionally independent, no AD     Visits from Start of Care: 11    Missed Visits: 3    Reporting Period : 24 to 24    Goals/Measure of Progress:  Short Term Goals: To be accomplished in 4 weeks:  Patient will be independent and compliant with HEP to progress toward goals and restore functional mobility.   Eval Status: issued at [facility]  PN: Pt reports compliance, MET     Patient will improve pain in low back to 6/10 at worst  to improve household chore tolerance.  Eval Status: 10/10 at worst  PN: Average pain 2/10. Met. 24     Long Term Goals: To be accomplished in 12 weeks:  Patient will improve FOTO score by 68 points to improve functional tolerance for household tasks.  Eval Status: FOTO 56  PN: Met FOTO score 75        2.   Pt will report minimal to no pain during repeated bending to aid in functional mechanics for ADLs.  Eval Status: increased pain with repeated flexion  PN: No pain. Met. 24     3.   Pt will have 5/5 B LE strength to return to goals of increased postural stability and mechanics during bending and lifting activities.  Eval Status: 4/5 to 4+/5  PN: B hip flex: 5/5, B hip ABD :5/5, Left Hip Ext 5/5, Right hip ext 5-/5. Met. 24     4.   Patient will improve pain in low back to 4/10 at worst to improve sitting and lying on side tolerance and restore prior level of

## 2024-04-23 NOTE — PROGRESS NOTES
PHYSICAL / OCCUPATIONAL THERAPY - DAILY TREATMENT NOTE     Patient Name: Jenny Aparicio    Date: 2024    : 1970  Insurance: Payor: GIORGI / Plan: [facility] [facility] [employer] / Product Type: *No Product type* /      Patient  verified Yes     Visit #   Current / Total 11 24   Time   In / Out 3:52 4:32   Pain   In / Out 0 0   Subjective Functional Status/Changes: Pt reports she was sick last week and did not come to therapy.    Changes to:  Allergies, Med Hx, Sx Hx?   no       TREATMENT AREA =  Back pain [M54.9]    OBJECTIVE      Therapeutic Procedures:  Tx Min Billable or 1:1 Min (if diff from Tx Min) Procedure, Rationale, Specifics   15  85865 Therapeutic Exercise (timed):  increase ROM, strength, coordination, balance, and proprioception to improve patient's ability to progress to PLOF and address remaining functional goals. (see flow sheet as applicable)    Details if applicable:       15  43878 Therapeutic Activity (timed):  use of dynamic activities replicating functional movements to increase ROM, strength, coordination, balance, and proprioception in order to improve patient's ability to progress to PLOF and address remaining functional goals.  (see flow sheet as applicable)    Details if applicable:     10  32750 Neuromuscular Re-Education (timed):  improve balance, coordination, kinesthetic sense, posture, core stability and proprioception to improve patient's ability to develop conscious control of individual muscles and awareness of position of extremities in order to progress to PLOF and address remaining functional goals. (see flow sheet as applicable)     Details if applicable:           Details if applicable:            Details if applicable:     40  [facility] Totals Reminder: bill using total billable min of TIMED therapeutic procedures (example: do not include dry needle or estim unattended, both untimed codes, in totals to left)  8-22 min = 1 unit; 23-37 min = 2 units; 38-52 min = 3

## 2025-05-30 ENCOUNTER — HOSPITAL ENCOUNTER (OUTPATIENT)
Facility: HOSPITAL | Age: 55
Discharge: HOME | End: 2025-05-30
Payer: COMMERCIAL

## 2025-05-30 VITALS — HEIGHT: 64 IN | WEIGHT: 201 LBS | BODY MASS INDEX: 34.31 KG/M2

## 2025-05-30 DIAGNOSIS — Z12.31 VISIT FOR SCREENING MAMMOGRAM: ICD-10-CM

## 2025-05-30 PROCEDURE — 77067 SCR MAMMO BI INCL CAD: CPT

## 2025-05-30 PROCEDURE — 77063 BREAST TOMOSYNTHESIS BI: CPT
